# Patient Record
Sex: FEMALE | Race: WHITE | NOT HISPANIC OR LATINO | URBAN - METROPOLITAN AREA
[De-identification: names, ages, dates, MRNs, and addresses within clinical notes are randomized per-mention and may not be internally consistent; named-entity substitution may affect disease eponyms.]

---

## 2022-07-22 VITALS
RESPIRATION RATE: 16 BRPM | HEART RATE: 82 BPM | SYSTOLIC BLOOD PRESSURE: 100 MMHG | HEIGHT: 65 IN | WEIGHT: 200.4 LBS | DIASTOLIC BLOOD PRESSURE: 66 MMHG | OXYGEN SATURATION: 99 % | TEMPERATURE: 98 F

## 2022-07-22 NOTE — ASU PATIENT PROFILE, ADULT - FALL HARM RISK - UNIVERSAL INTERVENTIONS
Bed in lowest position, wheels locked, appropriate side rails in place/Call bell, personal items and telephone in reach/Instruct patient to call for assistance before getting out of bed or chair/Non-slip footwear when patient is out of bed/Elim to call system/Physically safe environment - no spills, clutter or unnecessary equipment/Purposeful Proactive Rounding/Room/bathroom lighting operational, light cord in reach

## 2022-07-22 NOTE — ASU PATIENT PROFILE, ADULT - ANESTHESIA, PREVIOUS REACTION, PROFILE
----- Message from Víctor Kim MD sent at 5/12/2022 12:13 PM EDT -----  Please notify patient that her urine culture and cervical panels were all negative.  
none

## 2022-07-22 NOTE — ASU PREOP CHECKLIST - BOWEL PREP
Quality 111:Pneumonia Vaccination Status For Older Adults: Pneumococcal Vaccination Previously Received
Quality 138: Melanoma: Coordination Of Care: A treatment plan was communicated to the physicians providing continuing care within one month of diagnosis outlining: diagnosis, tumor thickness and a plan for surgery or alternate care.
Quality 137: Melanoma: Continuity Of Care - Recall System: Patient information entered into a recall system that includes: target date for the next exam specified AND a process to follow up with patients regarding missed or unscheduled appointments
Detail Level: Detailed
n/a
Quality 130: Documentation Of Current Medications In The Medical Record: Current Medications Documented
Quality 110: Preventive Care And Screening: Influenza Immunization: Influenza Immunization previously received during influenza season

## 2022-07-25 ENCOUNTER — INPATIENT (INPATIENT)
Facility: HOSPITAL | Age: 47
LOS: 0 days | Discharge: HOME | DRG: 743 | End: 2022-07-26
Attending: SPECIALIST | Admitting: SPECIALIST
Payer: COMMERCIAL

## 2022-07-25 DIAGNOSIS — Z98.890 OTHER SPECIFIED POSTPROCEDURAL STATES: Chronic | ICD-10-CM

## 2022-07-25 LAB
BLD GP AB SCN SERPL QL: NEGATIVE — SIGNIFICANT CHANGE UP
RH IG SCN BLD-IMP: POSITIVE — SIGNIFICANT CHANGE UP

## 2022-07-25 PROCEDURE — 88305 TISSUE EXAM BY PATHOLOGIST: CPT | Mod: 26

## 2022-07-25 PROCEDURE — 88112 CYTOPATH CELL ENHANCE TECH: CPT | Mod: 26

## 2022-07-25 PROCEDURE — 88305 TISSUE EXAM BY PATHOLOGIST: CPT | Mod: 26,59

## 2022-07-25 PROCEDURE — 88304 TISSUE EXAM BY PATHOLOGIST: CPT | Mod: 26

## 2022-07-25 DEVICE — SURGIFLO HEMOSTATIC MATRIX KIT: Type: IMPLANTABLE DEVICE | Status: FUNCTIONAL

## 2022-07-25 RX ORDER — SODIUM CHLORIDE 9 MG/ML
1000 INJECTION, SOLUTION INTRAVENOUS
Refills: 0 | Status: DISCONTINUED | OUTPATIENT
Start: 2022-07-25 | End: 2022-07-26

## 2022-07-25 RX ORDER — KETOROLAC TROMETHAMINE 30 MG/ML
30 SYRINGE (ML) INJECTION EVERY 6 HOURS
Refills: 0 | Status: COMPLETED | OUTPATIENT
Start: 2022-07-25 | End: 2022-07-26

## 2022-07-25 RX ORDER — GABAPENTIN 400 MG/1
300 CAPSULE ORAL ONCE
Refills: 0 | Status: DISCONTINUED | OUTPATIENT
Start: 2022-07-25 | End: 2022-07-25

## 2022-07-25 RX ORDER — HYDROXYZINE HCL 10 MG
25 TABLET ORAL AT BEDTIME
Refills: 0 | Status: DISCONTINUED | OUTPATIENT
Start: 2022-07-25 | End: 2022-07-26

## 2022-07-25 RX ORDER — HYDROXYZINE HCL 10 MG
0 TABLET ORAL
Qty: 0 | Refills: 0 | DISCHARGE

## 2022-07-25 RX ORDER — SIMETHICONE 80 MG/1
80 TABLET, CHEWABLE ORAL EVERY 6 HOURS
Refills: 0 | Status: DISCONTINUED | OUTPATIENT
Start: 2022-07-25 | End: 2022-07-26

## 2022-07-25 RX ORDER — FAMOTIDINE 10 MG/ML
20 INJECTION INTRAVENOUS DAILY
Refills: 0 | Status: DISCONTINUED | OUTPATIENT
Start: 2022-07-25 | End: 2022-07-26

## 2022-07-25 RX ORDER — CELECOXIB 200 MG/1
400 CAPSULE ORAL ONCE
Refills: 0 | Status: COMPLETED | OUTPATIENT
Start: 2022-07-25 | End: 2022-07-25

## 2022-07-25 RX ORDER — ONDANSETRON 8 MG/1
8 TABLET, FILM COATED ORAL EVERY 8 HOURS
Refills: 0 | Status: DISCONTINUED | OUTPATIENT
Start: 2022-07-25 | End: 2022-07-26

## 2022-07-25 RX ORDER — ACETAMINOPHEN 500 MG
1000 TABLET ORAL EVERY 6 HOURS
Refills: 0 | Status: DISCONTINUED | OUTPATIENT
Start: 2022-07-25 | End: 2022-07-26

## 2022-07-25 RX ORDER — OXYCODONE HYDROCHLORIDE 5 MG/1
5 TABLET ORAL EVERY 4 HOURS
Refills: 0 | Status: DISCONTINUED | OUTPATIENT
Start: 2022-07-25 | End: 2022-07-26

## 2022-07-25 RX ORDER — ACETAMINOPHEN 500 MG
1000 TABLET ORAL ONCE
Refills: 0 | Status: COMPLETED | OUTPATIENT
Start: 2022-07-25 | End: 2022-07-25

## 2022-07-25 RX ORDER — HYDROMORPHONE HYDROCHLORIDE 2 MG/ML
0.5 INJECTION INTRAMUSCULAR; INTRAVENOUS; SUBCUTANEOUS
Refills: 0 | Status: DISCONTINUED | OUTPATIENT
Start: 2022-07-25 | End: 2022-07-26

## 2022-07-25 RX ORDER — OXYCODONE HYDROCHLORIDE 5 MG/1
10 TABLET ORAL EVERY 4 HOURS
Refills: 0 | Status: DISCONTINUED | OUTPATIENT
Start: 2022-07-25 | End: 2022-07-26

## 2022-07-25 RX ORDER — OLANZAPINE 15 MG/1
1 TABLET, FILM COATED ORAL
Qty: 0 | Refills: 0 | DISCHARGE

## 2022-07-25 RX ORDER — METOCLOPRAMIDE HCL 10 MG
10 TABLET ORAL EVERY 8 HOURS
Refills: 0 | Status: DISCONTINUED | OUTPATIENT
Start: 2022-07-25 | End: 2022-07-26

## 2022-07-25 RX ORDER — HEPARIN SODIUM 5000 [USP'U]/ML
5000 INJECTION INTRAVENOUS; SUBCUTANEOUS ONCE
Refills: 0 | Status: COMPLETED | OUTPATIENT
Start: 2022-07-25 | End: 2022-07-25

## 2022-07-25 RX ADMIN — Medication 1000 MILLIGRAM(S): at 19:16

## 2022-07-25 RX ADMIN — Medication 1000 MILLIGRAM(S): at 07:06

## 2022-07-25 RX ADMIN — Medication 25 MILLIGRAM(S): at 22:06

## 2022-07-25 RX ADMIN — HEPARIN SODIUM 5000 UNIT(S): 5000 INJECTION INTRAVENOUS; SUBCUTANEOUS at 07:06

## 2022-07-25 RX ADMIN — Medication 30 MILLIGRAM(S): at 17:57

## 2022-07-25 RX ADMIN — HYDROMORPHONE HYDROCHLORIDE 0.5 MILLIGRAM(S): 2 INJECTION INTRAMUSCULAR; INTRAVENOUS; SUBCUTANEOUS at 15:20

## 2022-07-25 RX ADMIN — CELECOXIB 400 MILLIGRAM(S): 200 CAPSULE ORAL at 07:06

## 2022-07-25 RX ADMIN — HYDROMORPHONE HYDROCHLORIDE 0.5 MILLIGRAM(S): 2 INJECTION INTRAMUSCULAR; INTRAVENOUS; SUBCUTANEOUS at 15:35

## 2022-07-25 RX ADMIN — SIMETHICONE 80 MILLIGRAM(S): 80 TABLET, CHEWABLE ORAL at 17:57

## 2022-07-25 NOTE — BRIEF OPERATIVE NOTE - NSICDXBRIEFPOSTOP_GEN_ALL_CORE_FT
POST-OP DIAGNOSIS:  Right ovarian cyst 25-Jul-2022 14:39:00  Cisco Green  
POST-OP DIAGNOSIS:  Right ovarian cyst 25-Jul-2022 14:39:00  Cisco Green  Endometriosis 26-Jul-2022 07:48:45  Armando Reno

## 2022-07-25 NOTE — H&P ADULT - ASSESSMENT
45yo  w/ LMP  presents for scheduled L/S endo excision, b/l cystectomy, b/l salpingectomy.  - consent signed  - plan to proceed with scheduled surgery  - d/w Dr. Swanson

## 2022-07-25 NOTE — BRIEF OPERATIVE NOTE - NSICDXBRIEFPREOP_GEN_ALL_CORE_FT
PRE-OP DIAGNOSIS:  Right ovarian cyst 25-Jul-2022 14:38:48  Cisco Green  
PRE-OP DIAGNOSIS:  Right ovarian cyst 25-Jul-2022 14:38:48  Cisco Green  Endometriosis 26-Jul-2022 07:48:59  Armando Reno

## 2022-07-25 NOTE — BRIEF OPERATIVE NOTE - NSICDXBRIEFPROCEDURE_GEN_ALL_CORE_FT
PROCEDURES:  Laparoscopic right ovarian cystectomy 25-Jul-2022 14:24:31  Armando Reno   PROCEDURES:  Laparoscopic right ovarian cystectomy 25-Jul-2022 14:24:31  Armando Reno  Bilateral salpingectomy 25-Jul-2022 17:39:00  Armando Reno

## 2022-07-25 NOTE — H&P ADULT - HISTORY OF PRESENT ILLNESS
45yo  w/ LMP  presents for scheduled L/S endo excision, b/l cystectomy, b/l salpingectomy.    Obhx: 2005 term   Gynhx: b/l endometrioma's, endometriosis  PMH: panic attacks  PSH:  L/S R cyxtectomy (possibly teratoma)  meds: hydroxyzine 25mg qhs (for sleep), zyprexa 5mg qam  all: nkda  social: denies

## 2022-07-25 NOTE — DISCHARGE NOTE PROVIDER - NSDCCPCAREPLAN_GEN_ALL_CORE_FT
PRINCIPAL DISCHARGE DIAGNOSIS  Diagnosis: Endometriosis  Assessment and Plan of Treatment:        PRINCIPAL DISCHARGE DIAGNOSIS  Diagnosis: Endometriosis  Assessment and Plan of Treatment:       SECONDARY DISCHARGE DIAGNOSES  Diagnosis: Endometrioma of ovary  Assessment and Plan of Treatment:

## 2022-07-25 NOTE — BRIEF OPERATIVE NOTE - OPERATION/FINDINGS
b/l adnexal noted to be densely adhesed to their respective pelvic side wall. Thick fibrotic peritoneum overlaying the left adnexa was noted, with significantly difficulty identifying the L fallopian tube or L ovary. L fallopian tube noted to be adhered to the posterior uterus. Careful dissection of the L fallopian tube was performed, with subsequent salpingectomy performed with harmonic scalpel. Large R adnexal mass noted, with R fallopian tube and ovary adhered to the R pelvic sidewall and rectum. During dissection to mobilize the R adnexa off of the rectum, chocolate fluid and serous fluid noted, from multiple simple cysts and endometrioma of the R ovary. General surgery present to assist with dissection R adnexal structures from the rectum, please see their operative note for details. R ovarian cystectomy performed, cyst wall noted to be densely adhere to the ovary with minimal to no planes. Cyst wall removed in pieces with harmonic scalpel. General surgery present to perform methylene blue douche test, with no evidence of rectal leak. L/S b/l salpingectomy, R ovarian cystectomy, PRABHJOT, enterolysis, methylene blue douche test performed. b/l adnexal noted to be densely adhesed to their respective pelvic side wall. Thick fibrotic peritoneum overlaying the left adnexa was noted, with significantly difficulty identifying the L fallopian tube or L ovary. L fallopian tube noted to be adhered to the posterior uterus. Careful dissection of the L fallopian tube was performed, with subsequent salpingectomy performed with harmonic scalpel. Large R adnexal mass noted, with R fallopian tube and ovary adhered to the R pelvic sidewall and rectum. During dissection to mobilize the R adnexa off of the rectum, chocolate fluid and serous fluid noted, from multiple simple cysts and endometrioma of the R ovary. General surgery present to assist with dissection R adnexal structures from the rectum, please see their operative note for details. R ovarian cystectomy performed, cyst wall noted to be densely adhere to the ovary with minimal to no planes. Cyst wall removed in pieces with harmonic scalpel. R fallopian tubed excised with harmonic scalpel. General surgery present to perform methylene blue douche test, with no evidence of rectal leak. L/S b/l salpingectomy, R ovarian cystectomy, PRABHJOT, enterolysis, methylene blue douche test performed. b/l adnexal noted to be densely adhesed to their respective pelvic side wall. Thick fibrotic peritoneum overlaying the left adnexa was noted, with significantly difficulty identifying the L fallopian tube or L ovary. L fallopian tube noted to be adhered to the posterior uterus. Careful dissection of the L fallopian tube was performed, with subsequent salpingectomy performed with harmonic scalpel. Large R adnexal mass noted (about 4-5cm in size), with R fallopian tube and ovary adhered to the R pelvic sidewall and rectum. During dissection to mobilize the R adnexa off of the rectum, chocolate fluid and serous fluid noted, from multiple simple cysts and endometrioma of the R ovary. General surgery present to assist with dissection R adnexal structures from the rectum, please see their operative note for details. R ovarian cystectomy performed, cyst wall noted to be densely adhere to the ovary with minimal to no planes. Cyst wall removed in pieces with harmonic scalpel. R fallopian tubed excised with harmonic scalpel. General surgery present to perform methylene blue douche test, with no evidence of rectal leak.

## 2022-07-25 NOTE — DISCHARGE NOTE PROVIDER - HOSPITAL COURSE
45yo  s/p l/s b/l salpingectomy, R ovarian cystectomy, lysis of adhesions, methylene blue and douche test with neg leak. Dense fibrosis in b/l adnexa from endo, gen surg helped with dissection of R adnexa off the rectum and with methyleme blue douche test which was neg for leak. 6 hours case. Pt meeting post-op milestones on POD1 and is clinically stable for d/c   47yo  s/p l/s b/l salpingectomy, R ovarian cystectomy, lysis of adhesions, methylene blue and douche test with neg leak. Dense fibrosis in b/l adnexa from endo, gen surg helped with dissection of R adnexa off the rectum and with methyleme blue douche test which was neg for leak. 6 hours case. Pt meeting post-op milestones on POD1 and is clinically stable for d/c

## 2022-07-25 NOTE — PROGRESS NOTE ADULT - ASSESSMENT
45yo  POD0 s/p l/s b/l salpingectomy, R ovarian cystectomy, lysis of adhesions, methylene blue and douche test with neg leak. Gen surg present to mobilize rectum off of R adnexa, and to perform methylene blue leak test.    Neuro: Tylenol/Toradol ATC, oxy prn, Dilaudid prn. Home hydroxyzine ordered  Cards: euvolemic  Resp: RA  GI: CLD, advance to reg diet on , LR @ 125, zofran/reglan prn, simethicone, pepcid  : cordero  Heme:    DVT: SCDs

## 2022-07-25 NOTE — DISCHARGE NOTE PROVIDER - CARE PROVIDER_API CALL
Tracy Swanson)  Obstetrics and Gynecology  [address] [place], [place] [id number]  Phone: [phone]  Fax: [phone]  Follow Up Time:

## 2022-07-25 NOTE — BRIEF OPERATIVE NOTE - OPERATION/FINDINGS
Patient undergoing right ovarian cystectomy w/ adhesions to rectosigmoid. These were  with blunt dissection and the Harmonic. Hemostasis achieved. Methylene blue test negative for leaks or bulges. Please see gyn note for their portion of the case.

## 2022-07-26 VITALS
HEART RATE: 74 BPM | DIASTOLIC BLOOD PRESSURE: 66 MMHG | TEMPERATURE: 98 F | SYSTOLIC BLOOD PRESSURE: 100 MMHG | RESPIRATION RATE: 18 BRPM | OXYGEN SATURATION: 95 %

## 2022-07-26 LAB
BASOPHILS # BLD AUTO: 0.01 K/UL — SIGNIFICANT CHANGE UP (ref 0–0.2)
BASOPHILS NFR BLD AUTO: 0.1 % — SIGNIFICANT CHANGE UP (ref 0–2)
EOSINOPHIL # BLD AUTO: 0.02 K/UL — SIGNIFICANT CHANGE UP (ref 0–0.5)
EOSINOPHIL NFR BLD AUTO: 0.2 % — SIGNIFICANT CHANGE UP (ref 0–6)
HCT VFR BLD CALC: 31.6 % — LOW (ref 34.5–45)
HGB BLD-MCNC: 10.6 G/DL — LOW (ref 11.5–15.5)
IMM GRANULOCYTES NFR BLD AUTO: 0.6 % — SIGNIFICANT CHANGE UP (ref 0–1.5)
LYMPHOCYTES # BLD AUTO: 1.73 K/UL — SIGNIFICANT CHANGE UP (ref 1–3.3)
LYMPHOCYTES # BLD AUTO: 13.7 % — SIGNIFICANT CHANGE UP (ref 13–44)
MCHC RBC-ENTMCNC: 32.5 PG — SIGNIFICANT CHANGE UP (ref 27–34)
MCHC RBC-ENTMCNC: 33.5 GM/DL — SIGNIFICANT CHANGE UP (ref 32–36)
MCV RBC AUTO: 96.9 FL — SIGNIFICANT CHANGE UP (ref 80–100)
MONOCYTES # BLD AUTO: 0.91 K/UL — HIGH (ref 0–0.9)
MONOCYTES NFR BLD AUTO: 7.2 % — SIGNIFICANT CHANGE UP (ref 2–14)
NEUTROPHILS # BLD AUTO: 9.89 K/UL — HIGH (ref 1.8–7.4)
NEUTROPHILS NFR BLD AUTO: 78.2 % — HIGH (ref 43–77)
NON-GYNECOLOGICAL CYTOLOGY STUDY: SIGNIFICANT CHANGE UP
NRBC # BLD: 0 /100 WBCS — SIGNIFICANT CHANGE UP (ref 0–0)
PLATELET # BLD AUTO: 198 K/UL — SIGNIFICANT CHANGE UP (ref 150–400)
RBC # BLD: 3.26 M/UL — LOW (ref 3.8–5.2)
RBC # FLD: 13.7 % — SIGNIFICANT CHANGE UP (ref 10.3–14.5)
WBC # BLD: 12.63 K/UL — HIGH (ref 3.8–10.5)
WBC # FLD AUTO: 12.63 K/UL — HIGH (ref 3.8–10.5)

## 2022-07-26 RX ADMIN — SIMETHICONE 80 MILLIGRAM(S): 80 TABLET, CHEWABLE ORAL at 07:37

## 2022-07-26 RX ADMIN — FAMOTIDINE 20 MILLIGRAM(S): 10 INJECTION INTRAVENOUS at 11:52

## 2022-07-26 RX ADMIN — Medication 1000 MILLIGRAM(S): at 07:37

## 2022-07-26 RX ADMIN — Medication 1000 MILLIGRAM(S): at 01:38

## 2022-07-26 RX ADMIN — Medication 30 MILLIGRAM(S): at 01:37

## 2022-07-26 RX ADMIN — SIMETHICONE 80 MILLIGRAM(S): 80 TABLET, CHEWABLE ORAL at 01:38

## 2022-07-26 RX ADMIN — Medication 30 MILLIGRAM(S): at 07:35

## 2022-07-26 RX ADMIN — OXYCODONE HYDROCHLORIDE 10 MILLIGRAM(S): 5 TABLET ORAL at 05:02

## 2022-07-26 NOTE — PROGRESS NOTE ADULT - ASSESSMENT
46y Female now POD 1 lap R ovarian cystectomy (with adhesions to rectosigmoid) and enterolysis and methylene blue test (neg). Clinically doing well this morning.    - Adv diet per primary team  - Encourage OOBA  - Rest of care per primary  - Surgery Team 4C will continue to follow. Please page Team 4 with questions/clinical changes. [phone]

## 2022-07-26 NOTE — PROGRESS NOTE ADULT - ASSESSMENT
45yo  POD1 s/p l/s b/l salpingectomy, R ovarian cystectomy, lysis of adhesions, methylene blue and douche test with neg leak. Gen surg present to mobilize rectum off of R adnexa, and to perform methylene blue leak test.    Neuro: Tylenol/Toradol ATC, oxy prn, Dilaudid prn. Home hydroxyzine ordered  Cards: euvolemic  Resp: RA  GI: CLD (will advance this AM), advance to reg diet on , LR @ 125, zofran/reglan prn, simethicone, pepcid  : consuelo, will remove today  Heme:    DVT: SCDs

## 2022-07-26 NOTE — DISCHARGE NOTE NURSING/CASE MANAGEMENT/SOCIAL WORK - NSDCPEFALRISK_GEN_ALL_CORE
For information on Fall & Injury Prevention, visit: [email or web address] OR  [email or web address] OR  https://www.cdc.gov/steadi/patient.html

## 2022-07-26 NOTE — PROGRESS NOTE ADULT - ATTENDING COMMENTS
Doing well, eating  Discussed intra-operative findings, reviewed possible need for colectomy in future if still symptomatic. Reviewed SSx to call me for; she has my card.  Keep stools soft.

## 2022-07-26 NOTE — PROGRESS NOTE ADULT - ASSESSMENT
45yo  POD0 s/p l/s b/l salpingectomy, R ovarian cystectomy, lysis of adhesions, methylene blue and douche test with neg leak. Gen surg present to mobilize rectum off of R adnexa, and to perform methylene blue leak test. Given significant abd pain this morning with concerning abd exam, will order CT A/P with IV con to r/o intra-abdominal bleeding, even though suspicion is currently low given normal vital signs.    Neuro: Tylenol/Toradol ATC, oxy prn, Dilaudid prn. Home hydroxyzine ordered  Cards: euvolemic  Resp: RA  GI: CLD, advance to reg diet on , LR @ 125, zofran/reglan prn, simethicone, pepcid  : cordero  Heme:   DVT: SCDs

## 2022-07-26 NOTE — PROGRESS NOTE ADULT - SUBJECTIVE AND OBJECTIVE BOX
General Surgery Post op Check    Pt seen and examined without complaints. Pain is controlled. Denies nausea, emesis, not yet passing flatus.    Vital Signs Last 24 Hrs  T(C): --  T(F): --  HR: 92 (25 Jul 2022 15:55) (84 - 93)  BP: 106/54 (25 Jul 2022 15:55) (106/54 - 112/58)  BP(mean): 75 (25 Jul 2022 15:55) (74 - 80)  RR: 21 (25 Jul 2022 15:55) (17 - 21)  SpO2: 98% (25 Jul 2022 15:55) (97% - 100%)    Parameters below as of 25 Jul 2022 15:55  Patient On (Oxygen Delivery Method): nasal cannula  O2 Flow (L/min): 2      I&O's Summary    Physical Exam:  Gen: NAD, A&Ox3  Pulm: No respiratory distress, no subcostal retractions  CV: regular rate  Abd: Soft, NT, ND. Incisions are clean, dry, intact  Extremities:  FROM, warm and well perfused, equal bilateral muscle strength      A/P: 46y Female s/p lap R ovarian cystectomy (with adhesions to rectosigmoid). S/p enterolysis and methylene blue test negative for leak on 7/25.    -Strict I&O's  -Remainder of care per primary team    Surgery Team 4C    
  SUBJECTIVE: Pt seen and examined at bedside this am by surgery team. Patient reports passing gas, denies BMs. Told clears without nausea.    MEDICATIONS  (STANDING):  acetaminophen     Tablet .. 1000 milliGRAM(s) Oral every 6 hours  famotidine Injectable 20 milliGRAM(s) IV Push daily  hydrOXYzine hydrochloride 25 milliGRAM(s) Oral at bedtime  ketorolac   Injectable 30 milliGRAM(s) IV Push every 6 hours  lactated ringers. 1000 milliLiter(s) (125 mL/Hr) IV Continuous <Continuous>  simethicone 80 milliGRAM(s) Chew every 6 hours    MEDICATIONS  (PRN):  HYDROmorphone  Injectable 0.5 milliGRAM(s) IV Push every 15 minutes PRN Severe Pain (7 - 10)  metoclopramide Injectable 10 milliGRAM(s) IV Push every 8 hours PRN Nausea and/or Vomiting  ondansetron Injectable 8 milliGRAM(s) IV Push every 8 hours PRN Nausea and/or Vomiting  oxyCODONE    IR 5 milliGRAM(s) Oral every 4 hours PRN Moderate Pain (4 - 6)  oxyCODONE    IR 10 milliGRAM(s) Oral every 4 hours PRN Severe Pain (7 - 10)      Vital Signs Last 24 Hrs  T(C): 36.9 (26 Jul 2022 04:54), Max: 37.4 (26 Jul 2022 00:14)  T(F): 98.4 (26 Jul 2022 04:54), Max: 99.3 (26 Jul 2022 00:14)  HR: 86 (26 Jul 2022 04:54) (77 - 98)  BP: 106/64 (26 Jul 2022 04:54) (97/60 - 112/58)  BP(mean): 71 (25 Jul 2022 16:22) (71 - 80)  RR: 17 (26 Jul 2022 04:54) (16 - 21)  SpO2: 95% (26 Jul 2022 04:54) (94% - 100%)    Parameters below as of 26 Jul 2022 04:54  Patient On (Oxygen Delivery Method): room air        Physical Exam  General: NAD, resting comfortably in bed  Pulmonary: Nonlabored breathing, no respiratory distress  CV: NSR  Abd: soft, appropriately tender to palpation, minimally distended, no guarding, incisions c/d/i  Extremities: (-) edema, warm, well-perfused      I&O's Detail    25 Jul 2022 07:01  -  26 Jul 2022 07:00  --------------------------------------------------------  IN:    Lactated Ringers: 1625 mL    Oral Fluid: 1006 mL  Total IN: 2631 mL    OUT:    Indwelling Catheter - Urethral (mL): 1015 mL  Total OUT: 1015 mL    Total NET: 1616 mL          LABS:                        10.6   12.63 )-----------( 198      ( 26 Jul 2022 05:30 )             31.6                 RADIOLOGY & ADDITIONAL STUDIES:
GYN POST-OPERATIVE CHECK  Patient seen at bedside.  Pain controlled. Tolerating sips.  No OOB yet.  De La Cruz in place.    Denies CP, palpitations, SOB, fever, chills, nausea, vomiting.    Vital Signs Last 24 Hrs  T(C): 36.9 (25 Jul 2022 17:15), Max: 37.3 (25 Jul 2022 16:22)  T(F): 98.4 (25 Jul 2022 17:15), Max: 99.2 (25 Jul 2022 16:22)  HR: 77 (25 Jul 2022 17:15) (77 - 93)  BP: 105/67 (25 Jul 2022 17:15) (105/55 - 112/58)  BP(mean): 71 (25 Jul 2022 16:22) (71 - 80)  RR: 17 (25 Jul 2022 17:15) (17 - 21)  SpO2: 95% (25 Jul 2022 17:15) (95% - 100%)    Parameters below as of 25 Jul 2022 17:15  Patient On (Oxygen Delivery Method): room air        Physical Exam:  Gen: No Acute Distress  GI: soft, appropriately tender, non-distended, +BS, no rebound, no guarding.  Dressing C/D/I  : De La Cruz in place  Ext: SCDs in place, no erythema/tenderness    I&O's Summary    25 Jul 2022 07:01  -  25 Jul 2022 19:12  --------------------------------------------------------  IN: 430 mL / OUT: 65 mL / NET: 365 mL      MEDICATIONS  (STANDING):  acetaminophen     Tablet .. 1000 milliGRAM(s) Oral every 6 hours  famotidine Injectable 20 milliGRAM(s) IV Push daily  hydrOXYzine hydrochloride 25 milliGRAM(s) Oral at bedtime  ketorolac   Injectable 30 milliGRAM(s) IV Push every 6 hours  lactated ringers. 1000 milliLiter(s) (125 mL/Hr) IV Continuous <Continuous>  simethicone 80 milliGRAM(s) Chew every 6 hours    MEDICATIONS  (PRN):  HYDROmorphone  Injectable 0.5 milliGRAM(s) IV Push every 15 minutes PRN Severe Pain (7 - 10)  metoclopramide Injectable 10 milliGRAM(s) IV Push every 8 hours PRN Nausea and/or Vomiting  ondansetron Injectable 8 milliGRAM(s) IV Push every 8 hours PRN Nausea and/or Vomiting  oxyCODONE    IR 5 milliGRAM(s) Oral every 4 hours PRN Moderate Pain (4 - 6)  oxyCODONE    IR 10 milliGRAM(s) Oral every 4 hours PRN Severe Pain (7 - 10)    Allergies    No Known Allergies    Intolerances        LABS:                  
GYN Progress Note    Patient seen at bedside.  Pain controlled overnight.  Tolerating clears.  Not oob yet.  De La Cruz in place.  +flatus    Denies CP, palpitations, SOB, fever, chills, nausea, vomiting.    Vital Signs Last 24 Hrs  T(C): 36.9 (26 Jul 2022 04:54), Max: 37.4 (26 Jul 2022 00:14)  T(F): 98.4 (26 Jul 2022 04:54), Max: 99.3 (26 Jul 2022 00:14)  HR: 86 (26 Jul 2022 04:54) (77 - 98)  BP: 106/64 (26 Jul 2022 04:54) (97/60 - 112/58)  BP(mean): 71 (25 Jul 2022 16:22) (71 - 80)  RR: 17 (26 Jul 2022 04:54) (16 - 21)  SpO2: 95% (26 Jul 2022 04:54) (94% - 100%)    Parameters below as of 26 Jul 2022 04:54  Patient On (Oxygen Delivery Method): room air        Physical Exam:  Gen: No Acute Distress  GI: soft, appropriately tender, nondistended, +BS, no rebound, no guarding.  Incision C/D/I  Ext: SCDs in place, wnl    I&O's Summary    25 Jul 2022 07:01  -  26 Jul 2022 07:00  --------------------------------------------------------  IN: 2631 mL / OUT: 1015 mL / NET: 1616 mL      MEDICATIONS  (STANDING):  acetaminophen     Tablet .. 1000 milliGRAM(s) Oral every 6 hours  famotidine Injectable 20 milliGRAM(s) IV Push daily  hydrOXYzine hydrochloride 25 milliGRAM(s) Oral at bedtime  ketorolac   Injectable 30 milliGRAM(s) IV Push every 6 hours  lactated ringers. 1000 milliLiter(s) (125 mL/Hr) IV Continuous <Continuous>  simethicone 80 milliGRAM(s) Chew every 6 hours    MEDICATIONS  (PRN):  HYDROmorphone  Injectable 0.5 milliGRAM(s) IV Push every 15 minutes PRN Severe Pain (7 - 10)  metoclopramide Injectable 10 milliGRAM(s) IV Push every 8 hours PRN Nausea and/or Vomiting  ondansetron Injectable 8 milliGRAM(s) IV Push every 8 hours PRN Nausea and/or Vomiting  oxyCODONE    IR 5 milliGRAM(s) Oral every 4 hours PRN Moderate Pain (4 - 6)  oxyCODONE    IR 10 milliGRAM(s) Oral every 4 hours PRN Severe Pain (7 - 10)      LABS:                    
GYN Progress Note    Patient seen at bedside.  Pain not controlled overnight, requring PRN oxy whenever it was available for her.  Tolerating clears.  Not oob yet.  cordero in place.  no flatus yet.    Denies CP, palpitations, SOB, fever, chills, nausea, vomiting.    Vital Signs Last 24 Hrs  T(C): 36.9 (26 Jul 2022 04:54), Max: 37.4 (26 Jul 2022 00:14)  T(F): 98.4 (26 Jul 2022 04:54), Max: 99.3 (26 Jul 2022 00:14)  HR: 86 (26 Jul 2022 04:54) (77 - 98)  BP: 106/64 (26 Jul 2022 04:54) (97/60 - 112/58)  BP(mean): 71 (25 Jul 2022 16:22) (71 - 80)  RR: 17 (26 Jul 2022 04:54) (16 - 21)  SpO2: 95% (26 Jul 2022 04:54) (94% - 100%)    Parameters below as of 26 Jul 2022 04:54  Patient On (Oxygen Delivery Method): room air        Physical Exam:  Gen: No Acute Distress  GI: soft,  significant tenderness to palpation, nondistended, hypoactive BS, no rebound, significant guarding.  Incision C/D/I, compression dressing removed.  Ext: SCDs in place, [facility]    I&O's Summary    25 Jul 2022 07:01  -  26 Jul 2022 06:54  --------------------------------------------------------  IN: 2631 mL / OUT: 1015 mL / NET: 1616 mL      MEDICATIONS  (STANDING):  acetaminophen     Tablet .. 1000 milliGRAM(s) Oral every 6 hours  famotidine Injectable 20 milliGRAM(s) IV Push daily  hydrOXYzine hydrochloride 25 milliGRAM(s) Oral at bedtime  ketorolac   Injectable 30 milliGRAM(s) IV Push every 6 hours  lactated ringers. 1000 milliLiter(s) (125 mL/Hr) IV Continuous <Continuous>  simethicone 80 milliGRAM(s) Chew every 6 hours    MEDICATIONS  (PRN):  HYDROmorphone  Injectable 0.5 milliGRAM(s) IV Push every 15 minutes PRN Severe Pain (7 - 10)  metoclopramide Injectable 10 milliGRAM(s) IV Push every 8 hours PRN Nausea and/or Vomiting  ondansetron Injectable 8 milliGRAM(s) IV Push every 8 hours PRN Nausea and/or Vomiting  oxyCODONE    IR 5 milliGRAM(s) Oral every 4 hours PRN Moderate Pain (4 - 6)  oxyCODONE    IR 10 milliGRAM(s) Oral every 4 hours PRN Severe Pain (7 - 10)      LABS:

## 2022-08-02 LAB — SURGICAL PATHOLOGY STUDY: SIGNIFICANT CHANGE UP

## 2022-08-04 DIAGNOSIS — N80.2 ENDOMETRIOSIS OF FALLOPIAN TUBE: ICD-10-CM

## 2022-08-04 DIAGNOSIS — N80.0 ENDOMETRIOSIS OF UTERUS: ICD-10-CM

## 2022-08-04 DIAGNOSIS — N80.1 ENDOMETRIOSIS OF OVARY: ICD-10-CM

## 2022-08-04 DIAGNOSIS — F41.0 PANIC DISORDER [EPISODIC PAROXYSMAL ANXIETY]: ICD-10-CM

## 2022-08-04 DIAGNOSIS — N80.5 ENDOMETRIOSIS OF INTESTINE: ICD-10-CM

## 2022-08-11 PROCEDURE — 85025 COMPLETE CBC W/AUTO DIFF WBC: CPT

## 2022-08-11 PROCEDURE — 88305 TISSUE EXAM BY PATHOLOGIST: CPT

## 2022-08-11 PROCEDURE — 86900 BLOOD TYPING SEROLOGIC ABO: CPT

## 2022-08-11 PROCEDURE — 86901 BLOOD TYPING SEROLOGIC RH(D): CPT

## 2022-08-11 PROCEDURE — 88112 CYTOPATH CELL ENHANCE TECH: CPT

## 2022-08-11 PROCEDURE — 86850 RBC ANTIBODY SCREEN: CPT

## 2022-08-11 PROCEDURE — 88304 TISSUE EXAM BY PATHOLOGIST: CPT

## 2022-08-11 PROCEDURE — 36415 COLL VENOUS BLD VENIPUNCTURE: CPT

## 2022-08-11 PROCEDURE — C1889: CPT

## (undated) DEVICE — GLV 6.5 PROTEXIS (WHITE)

## (undated) DEVICE — TUBING STRYKER PNEUMOCLEAR HIGH FLOW HEATED

## (undated) DEVICE — DRSG DERMABOND 0.7ML

## (undated) DEVICE — SUT VICRYL 0 27" UR-6

## (undated) DEVICE — PREP CHLORAPREP HI-LITE ORANGE 26ML

## (undated) DEVICE — SHEARS HARMONIC 1100 5MM X 36CM CURVED TIP

## (undated) DEVICE — POSITIONER PINK PAD PIGAZZI SYSTEM XL W ARM PROTECTOR

## (undated) DEVICE — SUT MONOCRYL 4-0 27" PS-2 UNDYED

## (undated) DEVICE — SPONGE ENDO PEANUT 5MM

## (undated) DEVICE — TROCAR COVIDIEN VERSAONE BLUNT TIP HASSAN 12MM

## (undated) DEVICE — DRSG STERISTRIPS 0.25 X 4"

## (undated) DEVICE — POSITIONER FOAM EGG CRATE ULNAR 2PCS (PINK)

## (undated) DEVICE — VENODYNE/SCD SLEEVE CALF MEDIUM

## (undated) DEVICE — Device

## (undated) DEVICE — HANDPIECE HARMONIC BLUE

## (undated) DEVICE — CLIPPER BLADE GENERAL USE

## (undated) DEVICE — PACK GYN WDC

## (undated) DEVICE — TUBING TUR 2 PRONG

## (undated) DEVICE — DRSG MASTISOL

## (undated) DEVICE — TROCAR ETHICON ENDOPATH XCEL BLADELESS 5MM X 100MM STABILITY

## (undated) DEVICE — INSUFFLATION NDL COVIDIEN SURGINEEDLE VERESS 120MM

## (undated) DEVICE — FOLEY TRAY 16FR 5CC LF UMETER CLOSED

## (undated) DEVICE — GLV 7 PROTEXIS (WHITE)

## (undated) DEVICE — ENDOPATH 5MM CVD SCISSOR W MONOPOLAR CAUTERY DISP

## (undated) DEVICE — TROCAR COVIDIEN VERSAONE FIXATION CANNULA 5MM

## (undated) DEVICE — D HELP - CLEARVIEW CLEARIFY SYSTEM

## (undated) DEVICE — SHEARS HARMONIC ACE 5MM X 36CM CURVED TIP

## (undated) DEVICE — WARMING BLANKET UPPER ADULT

## (undated) DEVICE — LAP PAD 18 X 18"

## (undated) DEVICE — FOLEY CATH 2-WAY 24FR 5CC SILICONE

## (undated) DEVICE — MONOPOLAR CORD HI FREQ DISPOSABLE

## (undated) DEVICE — SOL IRR BAG NS 0.9% 3000ML

## (undated) DEVICE — ENDOCATCH 10MM SPECIMEN POUCH

## (undated) DEVICE — TROCAR COVIDIEN VERSAPORT BLADELESS OPTICAL 5MM STANDARD

## (undated) DEVICE — DRSG STERISTRIPS 0.5 X 4"

## (undated) DEVICE — APPLICATOR ENDOSCOPIC FOR SUGIFLO

## (undated) DEVICE — TROCAR ETHICON ENDOPATH XCEL BLADELESS 12MM X 100MM STABILITY

## (undated) DEVICE — GLV 7.5 PROTEXIS (WHITE)

## (undated) DEVICE — TIP METZENBAUM SCISSOR MONOPOLAR ENDOCUT (ORANGE)